# Patient Record
Sex: MALE | Race: WHITE | URBAN - METROPOLITAN AREA
[De-identification: names, ages, dates, MRNs, and addresses within clinical notes are randomized per-mention and may not be internally consistent; named-entity substitution may affect disease eponyms.]

---

## 2018-01-12 ENCOUNTER — HOSPITAL ENCOUNTER (OUTPATIENT)
Dept: MRI IMAGING | Age: 68
Discharge: HOME | End: 2018-01-12
Attending: ORTHOPAEDIC SURGERY | Admitting: ORTHOPAEDIC SURGERY

## 2018-01-12 DIAGNOSIS — M75.102 TEAR OF LEFT ROTATOR CUFF: ICD-10-CM

## 2018-01-12 DIAGNOSIS — M75.102 TEAR OF LEFT ROTATOR CUFF, UNSPECIFIED TEAR EXTENT: ICD-10-CM

## 2018-01-17 ENCOUNTER — TELEPHONE (OUTPATIENT)
Dept: ORTHOPEDIC SURGERY | Age: 68
End: 2018-01-17

## 2018-01-25 ENCOUNTER — HOSPITAL ENCOUNTER (OUTPATIENT)
Dept: SURGERY | Age: 68
Discharge: HOME | End: 2018-01-25
Attending: ORTHOPAEDIC SURGERY | Admitting: ORTHOPAEDIC SURGERY

## 2018-01-25 VITALS
OXYGEN SATURATION: 94 % | RESPIRATION RATE: 18 BRPM | DIASTOLIC BLOOD PRESSURE: 92 MMHG | SYSTOLIC BLOOD PRESSURE: 138 MMHG | TEMPERATURE: 97.1 F | BODY MASS INDEX: 31.62 KG/M2 | HEART RATE: 90 BPM | WEIGHT: 238.56 LBS | HEIGHT: 73 IN

## 2018-01-25 RX ORDER — CEFAZOLIN SODIUM 2 G/100ML
2 INJECTION, SOLUTION INTRAVENOUS
Status: COMPLETED | OUTPATIENT
Start: 2018-01-25 | End: 2018-01-25

## 2018-01-25 RX ORDER — ONDANSETRON 2 MG/ML
4 INJECTION INTRAMUSCULAR; INTRAVENOUS
Status: ACTIVE | OUTPATIENT
Start: 2018-01-25 | End: 2018-01-25

## 2018-01-25 RX ORDER — OXYCODONE HYDROCHLORIDE AND ACETAMINOPHEN 5; 325 MG/1; MG/1
1 TABLET ORAL
Status: ACTIVE | OUTPATIENT
Start: 2018-01-25 | End: 2018-01-25

## 2018-01-25 RX ORDER — SODIUM CHLORIDE 0.9 % (FLUSH) 0.9 %
10 SYRINGE (ML) INJECTION PRN
Status: DISCONTINUED | OUTPATIENT
Start: 2018-01-25 | End: 2018-01-26 | Stop reason: HOSPADM

## 2018-01-25 RX ORDER — HYDROMORPHONE HCL 110MG/55ML
0.5 PATIENT CONTROLLED ANALGESIA SYRINGE INTRAVENOUS EVERY 5 MIN PRN
Status: DISCONTINUED | OUTPATIENT
Start: 2018-01-25 | End: 2018-01-26 | Stop reason: HOSPADM

## 2018-01-25 RX ORDER — SODIUM CHLORIDE 0.9 % (FLUSH) 0.9 %
10 SYRINGE (ML) INJECTION EVERY 12 HOURS SCHEDULED
Status: DISCONTINUED | OUTPATIENT
Start: 2018-01-25 | End: 2018-01-26 | Stop reason: HOSPADM

## 2018-01-25 RX ORDER — MIDAZOLAM HYDROCHLORIDE 1 MG/ML
2 INJECTION INTRAMUSCULAR; INTRAVENOUS ONCE
Status: CANCELLED | OUTPATIENT
Start: 2018-01-25

## 2018-01-25 RX ORDER — SODIUM CHLORIDE, SODIUM LACTATE, POTASSIUM CHLORIDE, CALCIUM CHLORIDE 600; 310; 30; 20 MG/100ML; MG/100ML; MG/100ML; MG/100ML
INJECTION, SOLUTION INTRAVENOUS CONTINUOUS
Status: DISCONTINUED | OUTPATIENT
Start: 2018-01-25 | End: 2018-01-26 | Stop reason: HOSPADM

## 2018-01-25 RX ORDER — MIDAZOLAM HYDROCHLORIDE 1 MG/ML
4 INJECTION INTRAMUSCULAR; INTRAVENOUS ONCE
Status: COMPLETED | OUTPATIENT
Start: 2018-01-25 | End: 2018-01-25

## 2018-01-25 RX ORDER — LIDOCAINE HYDROCHLORIDE 10 MG/ML
0.5 INJECTION, SOLUTION EPIDURAL; INFILTRATION; INTRACAUDAL; PERINEURAL ONCE
Status: DISCONTINUED | OUTPATIENT
Start: 2018-01-25 | End: 2018-01-26 | Stop reason: HOSPADM

## 2018-01-25 RX ORDER — LIDOCAINE HYDROCHLORIDE 10 MG/ML
1 INJECTION, SOLUTION EPIDURAL; INFILTRATION; INTRACAUDAL; PERINEURAL
Status: ACTIVE | OUTPATIENT
Start: 2018-01-25 | End: 2018-01-25

## 2018-01-25 RX ORDER — LABETALOL HYDROCHLORIDE 5 MG/ML
5 INJECTION, SOLUTION INTRAVENOUS EVERY 10 MIN PRN
Status: DISCONTINUED | OUTPATIENT
Start: 2018-01-25 | End: 2018-01-26 | Stop reason: HOSPADM

## 2018-01-25 RX ORDER — HYDRALAZINE HYDROCHLORIDE 20 MG/ML
5 INJECTION INTRAMUSCULAR; INTRAVENOUS EVERY 10 MIN PRN
Status: DISCONTINUED | OUTPATIENT
Start: 2018-01-25 | End: 2018-01-26 | Stop reason: HOSPADM

## 2018-01-25 RX ORDER — MEPERIDINE HYDROCHLORIDE 25 MG/ML
12.5 INJECTION INTRAMUSCULAR; INTRAVENOUS; SUBCUTANEOUS EVERY 5 MIN PRN
Status: DISCONTINUED | OUTPATIENT
Start: 2018-01-25 | End: 2018-01-26 | Stop reason: HOSPADM

## 2018-01-25 RX ADMIN — MIDAZOLAM HYDROCHLORIDE 4 MG: 1 INJECTION INTRAMUSCULAR; INTRAVENOUS at 08:49

## 2018-01-25 RX ADMIN — CEFAZOLIN SODIUM 2 G: 2 INJECTION, SOLUTION INTRAVENOUS at 10:08

## 2018-01-25 RX ADMIN — SODIUM CHLORIDE, SODIUM LACTATE, POTASSIUM CHLORIDE, CALCIUM CHLORIDE: 600; 310; 30; 20 INJECTION, SOLUTION INTRAVENOUS at 08:20

## 2018-01-25 ASSESSMENT — PAIN - FUNCTIONAL ASSESSMENT: PAIN_FUNCTIONAL_ASSESSMENT: 0-10

## 2018-01-25 NOTE — PROCEDURES
Ultrasound-guided Interscalene Nerve Block, Single Shot, Procedure Note    Jules Rider    Procedure: Left Interscalene Nerve Block, Single Shot, Ultrasound-guided  Indications: Postoperative pain control and as requested by surgeon  Consent: Risks/Benefits including infection and nerve injury discussed and         patient wishes to proceed  Timeout completed with RN  Patient Position: Semi-recumbent  Sedation: Versed 4 mg         Fentanyl 0 ml         Propofol 50 mg  Skin Prep: Chlorhexadine  Procedure Note:  Patient identified, consented, site marked, time out performed. Monitors in place. Patient positioned semi-recumbent. Left neck prepped. Interscalene brachial plexus identified on ultrasound. 1% lidocaine used to anesthetize the skin and subcutaneous tissues. 2 in. 21 ga. short bevel Stimuplex needle inserted under ultrasound guidance from lateral aspect in plane with tip in view throughout to the brachial plexus. In divided doses and with negative aspiration throughout, 30 cc of 0.5% bupivacaine plain and preservative free was slowly injected around the plexus. No parasthesias noted and good local spread seen on ultrasound. Patient tolerated the procedure well without any apparent or immediate complications. Ultrasound images placed in patient's chart.     Camille Cobos  9:07 AM

## 2018-01-25 NOTE — H&P
3. Elevated blood pressure:  Likely due to anxiety. Pt reports that he recently \"passed\" his comprehensive [employer] physical      Patient is  medically optimized for surgery. Thank you for the opportunity to participate in the care of your patient.   Guerline Amaro CNP    1/25/2018 8:32 AM

## 2018-01-25 NOTE — PROGRESS NOTES
Time out done @ 0848, then versed 4 mg iv given & 02 @ 2 l/min per n/c given, then dr Blessing Borges completed left intrascalene block, patient tolerated well.

## 2018-01-25 NOTE — ANESTHESIA PRE-OP
Department of Anesthesiology  Preprocedure Note       Name:  Darryl Long   Age:  78 y.o.  :  1950                                          MRN:  [id number]         Date:  2018      Surgeon:    Procedure:    Medications prior to admission:   Prior to Admission medications    Medication Sig Start Date End Date Taking? Authorizing Provider   oxyCODONE-acetaminophen (PERCOCET) 5-325 MG per tablet Take 1-2 tablets by mouth every 4 hours as needed for Pain for up to 7 days 1-2 tablets every 3-4 hours when necessary pain. 18  Valarie Golden MD       Current medications:    Current Outpatient Prescriptions   Medication Sig Dispense Refill    oxyCODONE-acetaminophen (PERCOCET) 5-325 MG per tablet Take 1-2 tablets by mouth every 4 hours as needed for Pain for up to 7 days 1-2 tablets every 3-4 hours when necessary pain.  40 tablet 0     Current Facility-Administered Medications   Medication Dose Route Frequency Provider Last Rate Last Dose    ceFAZolin (ANCEF) 2 g in dextrose 4 % 100 mL IVPB (premix)  2 g Intravenous On Call to [address] Ng MD        lactated ringers infusion   Intravenous Continuous Valarie Golden MD        lidocaine PF 1 % injection 0.5 mL  0.5 mL Subcutaneous Once Valarie Golden MD        HYDROmorphone (DILAUDID) injection 0.5 mg  0.5 mg Intravenous Q5 Min PRN Linda Aparicio MD        oxyCODONE-acetaminophen (PERCOCET) 5-325 MG per tablet 1 tablet  1 tablet Oral Once PRN Linda Aparicio MD        ondansetron [facility]) injection 4 mg  4 mg Intravenous Once PRN Linda Aparicio MD        labetalol (NORMODYNE;TRANDATE) injection 5 mg  5 mg Intravenous Q10 Min PRN Linda Aparicio MD        hydrALAZINE (APRESOLINE) injection 5 mg  5 mg Intravenous Q10 Min PRN Linda Aparicio MD        meperidine (DEMEROL) injection 12.5 mg  12.5 mg Intravenous Q5 Min PRN Linda Aparicio MD Allergies:  No Known Allergies    Problem List:  There is no problem list on file for this patient. Past Medical History:        Diagnosis Date    MRSA (methicillin resistant Staphylococcus aureus) infection 9/12    Prolonged emergence from general anesthesia        Past Surgical History:        Procedure Laterality Date    SHOULDER SURGERY      right        Social History:    Social History   Substance Use Topics    Smoking status: Former Smoker     Types: Cigars    Smokeless tobacco: Never Used    Alcohol use 0.0 oz/week                                Counseling given: Not Answered      Vital Signs (Current): There were no vitals filed for this visit. BP Readings from Last 3 Encounters:   01/24/18 131/86   01/17/18 139/82   01/08/18 128/81       NPO Status:                                                                                 BMI:   Wt Readings from Last 3 Encounters:   01/24/18 235 lb 0.2 oz (106.6 kg)   01/18/18 235 lb (106.6 kg)   01/17/18 229 lb 15 oz (104.3 kg)     There is no height or weight on file to calculate BMI. Anesthesia Evaluation  Patient summary reviewed and Nursing notes reviewed history of anesthetic complications (prolonged emergence):    Airway: Mallampati: II  TM distance: >3 FB   Neck ROM: full  Mouth opening: > = 3 FB Dental: normal exam         Pulmonary:normal exam  breath sounds clear to auscultation      (-) COPD, asthma and sleep apnea                          ROS comment: Former smoker   Cardiovascular:Negative CV ROS        (-) hypertension, past MI, CAD, CABG/stent, dysrhythmias,  angina and  CHF      Rhythm: regular  Rate: normal                    Neuro/Psych:   Negative Neuro/Psych ROS     (-) seizures, TIA and CVA           GI/Hepatic/Renal: Neg GI/Hepatic/Renal ROS       (-) GERD, liver disease and no renal disease       Endo/Other: Negative Endo/Other ROS       (-) hypothyroidism, hyperthyroidism, no Type

## 2018-01-25 NOTE — BRIEF OP NOTE
Brief Postoperative Note    Margret Villa  YOB: 1950  [id number]    Pre-operative Diagnosis: left shoulder rotator cuff tear, impingement, ac arthritis, biceps tear    Post-operative Diagnosis: Same    Procedure: left shoulder arthroscopy, rotator cuff repair, subacromial decompression, distal clavicle excision, major debridement, completion biceps tenotomy    Anesthesia: General and Nerve Block    Surgeons/Assistants: Jerry Garcia MD; Kevin Hinkle MD      Estimated Blood Loss: less than 50     Complications: None    Specimens: Was Not Obtained    Findings: as above    Electronically signed by Venessa Recinos MD on 1/25/2018 at 12:13 PM

## 2018-01-26 ENCOUNTER — OFFICE VISIT (OUTPATIENT)
Dept: ORTHOPEDIC SURGERY | Age: 68
End: 2018-01-26

## 2018-01-26 ENCOUNTER — HOSPITAL ENCOUNTER (OUTPATIENT)
Dept: PHYSICAL THERAPY | Age: 68
Discharge: HOME | End: 2018-01-31
Admitting: ORTHOPAEDIC SURGERY

## 2018-01-26 VITALS
DIASTOLIC BLOOD PRESSURE: 89 MMHG | HEART RATE: 90 BPM | SYSTOLIC BLOOD PRESSURE: 139 MMHG | BODY MASS INDEX: 31.61 KG/M2 | WEIGHT: 238.54 LBS | HEIGHT: 73 IN

## 2018-01-26 DIAGNOSIS — M75.102 TEAR OF LEFT ROTATOR CUFF, UNSPECIFIED TEAR EXTENT: Primary | ICD-10-CM

## 2018-01-26 PROCEDURE — 99024 POSTOP FOLLOW-UP VISIT: CPT | Performed by: ORTHOPAEDIC SURGERY

## 2018-01-26 RX ORDER — DIAZEPAM 5 MG/1
10 TABLET ORAL NIGHTLY PRN
Qty: 5 TABLET | Refills: 0 | Status: SHIPPED | OUTPATIENT
Start: 2018-01-26 | End: 2018-02-05

## 2018-01-26 NOTE — PROGRESS NOTES
Patient returns today one day status post left shoulder rotator cuff repair distal clavicle excision. He is doing well. He still has partial blockade from his interscalene block. ROS: Pertinent items are noted in HPI. No notes on file    Past Medical History:  9/12: MRSA (methicillin resistant Staphylococcus aur*  No date: Prolonged emergence from general anesthesia     Past Surgical History:  01/25/2018: SHOULDER ARTHROSCOPY Left      Comment: DCE SAD RCR  No date: SHOULDER SURGERY      Comment: right     Review of patient's family history indicates:    Cancer                         Father                      Comment: bladder       Social History    Marital status: Single              Spouse name: Zoë Vasquez                 Years of education:                 Number of children: 1             Social History Main Topics    Smoking status: Former Smoker                                                                Packs/day: 0.00      Years: 0.00           Types: Cigars    Smokeless tobacco: Never Used                        Alcohol use: Yes           0.0 oz/week       Comment: social, beer,\"when watch Photos to Photos[employer]\"    Drug use: No                No current outpatient prescriptions on file. No current facility-administered medications for this visit. No Known Allergies    VITAL SIGNS:  /89   Pulse 90   Ht 6' 1\" (1.854 m)   Wt 238 lb 8.6 oz (108.2 kg)   BMI 31.47 kg/m²   On examination his portals are clean and dry. He is comfortable. Moving his elbow wrist and hand normally. He is performing physical therapy without problems. We went over his pictures and showed him what we did. He said his pain medication to awaken and so given him a sedative for just a few days. All questions were answered. Impression doing well one day status post left rotator cuff repair. Plan: Start outpatient physical therapy.

## 2018-01-26 NOTE — PLAN OF CARE
[facility]      Physical Therapy Certification    Dear Referring Practitioner: Trice Carolina. Moises Harding MD,    We had the pleasure of evaluating the following patient for physical therapy services at [address] [place] [address]. A summary of our findings can be found in the initial assessment below. This includes our plan of care. If you have any questions or concerns regarding these findings, please do not hesitate to contact me at the office phone number checked above. Thank you for the referral.       Physician Signature:_______________________________Date:__________________  By signing above (or electronic signature), therapists plan is approved by physician      Patient: Sherrin Spatz   : 1950   MRN: [id number]  Referring Physician: Referring Practitioner: Trice Carolina. Moises Harding MD      Evaluation Date: 2018      Medical Diagnosis Information:  Diagnosis: M75.102 (ICD-10-CM) - Tear of left rotator cuff, unspecified tear extent   Treatment Diagnosis: L shoulder pain, decreased ROM and strength, impaired ADLs/IADLs                                         Insurance information: PT Insurance Information: PT BENEFITS 2018 FACILITY/ EFFECTIVE 16/ ACTIVE/ DED 0/ COPAY 40/ PAYS 100% OOP 4900/ 0 VISIT LIMIT MED NEC/ ORTHONET AUTH SANIAQ/VEGA [id number] PAG/PW DONE  CB/    Precautions/ Contra-indications:   Latex Allergy:  [x]NO      []YES  Preferred Language for Healthcare:   [x]English       []other:    SUBJECTIVE: Patient stated complaint: Pt states that his L shoulder has been bothering him for a long time, but recently he fell. Pt states that he was walking around his  truck and he fell into a pit, tried to grab the mirror to catch himself and fell to the ground. Pt states that since that time he was unable to use his shoulder, lifting his arm was difficult.  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 [] Falls Risk assessed and Patient requires intervention due to being higher risk   TUG score (>12s at risk):     [] Falls education provided, including       G-Codes:  PT G-Codes  Functional Assessment Tool Used: UEFI  Score: 7/80=8.75% (91.25% deficit)  Functional Limitation: Carrying, moving and handling objects  Carrying, Moving and Handling Objects Current Status (): At least 80 percent but less than 100 percent impaired, limited or restricted  Carrying, Moving and Handling Objects Goal Status ():  At least 1 percent but less than 20 percent impaired, limited or restricted    ASSESSMENT:   Functional Impairments   []Noted spinal or UE joint hypomobility   []Noted spinal or UE joint hypermobility   [x]Decreased UE functional ROM   [x]Decreased UE functional strength   []Abnormal reflexes/sensation/myotomal/dermatomal deficits   []Decreased RC/scapular/core strength and neuromuscular control   []other:      Functional Activity Limitations (from functional questionnaire and intake)   [x]Reduced ability to tolerate prolonged functional positions   [x]Reduced ability or difficulty with changes of positions or transfers between positions   []Reduced ability to maintain good posture and demonstrate good body mechanics with sitting, bending, and lifting   [] Reduced ability or tolerance with driving and/or computer work   [x]Reduced ability to sleep   [x]Reduced ability to perform lifting, reaching, carrying tasks   [x]Reduced ability to tolerate impact through UE   [x]Reduced ability to reach behind back   []Reduced ability to  or hold objects   []Reduced ability to throw or toss an object   []other:      Participation Restrictions   [x]Reduced participation in self care activities   [x]Reduced participation in home management activities   [x]Reduced participation in work activities   [x]Reduced participation in social activities. []Reduced participation in sport / recreational activities. Classification:   [x]Signs/symptoms consistent with post-surgical status including decreased ROM, strength and function.   []Signs/symptoms consistent with joint sprain/strain   []Signs/symptoms consistent with shoulder impingement   []Signs/symptoms consistent with shoulder/elbow/wrist tendinopathy   []Signs/symptoms consistent with Rotator cuff tear   []Signs/symptoms consistent with labral tear   []Signs/symptoms consistent with postural dysfunction    []Signs/symptoms consistent with Glenohumeral IR Deficit - <45 degrees   []Signs/symptoms consistent with facet dysfunction of cervical/thoracic spine    []Signs/symptoms consistent with pathology which may benefit from Dry needling     []other:     Prognosis/Rehab Potential:      []Excellent   [x]Good    []Fair   []Poor    Tolerance of evaluation/treatment:    []Excellent   [x]Good    []Fair   []Poor    Physical Therapy Evaluation Complexity Justification  [x] A history of present problem with:  [x] no personal factors and/or comorbidities that impact the plan of care;  []1-2 personal factors and/or comorbidities that impact the plan of care  []3 personal factors and/or comorbidities that impact the plan of care  [x] An examination of body systems using standardized tests and measures addressing any of the following: body structures and functions (impairments), activity limitations, and/or participation restrictions;:  [] a total of 1-2 or more elements   [] a total of 3 or more elements   [x] a total of 4 or more elements   [x] A clinical presentation with:  [] stable and/or uncomplicated characteristics   [x] evolving clinical presentation with changing characteristics  [] unstable and unpredictable characteristics;   [x] Clinical decision making of [] low, [x] moderate, [] high complexity using standardized patient assessment instrument and/or measurable assessment of functional outcome. [] EVAL (LOW) 41155 (typically 20 minutes face-to-face)  [x] EVAL (MOD) 08340 (typically 30 minutes face-to-face)  [] EVAL (HIGH) 25944 (typically 45 minutes face-to-face)  [] RE-EVAL       PLAN:  Frequency/Duration:  1-2 days per week for 12 Weeks:  INTERVENTIONS:  [x] Therapeutic exercise including: strength training, ROM, for Upper extremity and core   [x]  NMR activation and proprioception for UE, scap and Core   [x] Manual therapy as indicated for shoulder, scapula and spine to include: Dry Needling/IASTM, STM, PROM, Gr I-IV mobilizations, manipulation. [x] Modalities as needed that may include: thermal agents, E-stim, Biofeedback, US, iontophoresis as indicated  [x] Patient education on joint protection, postural re-education, activity modification, progression of HEP. HEP instruction:   Initiated 1/26/18. Printed hand out given. Pt demonstrated proper form of each exercise and expressed verbal understanding of frequency and duration. GOALS:  Patient stated goal: be pain free    Therapist goals for Patient:   Short Term Goals: To be achieved in: 4-8 weeks  1. Independent in HEP and progression per patient tolerance, in order to prevent re-injury. 2. Patient will have a decrease in pain to facilitate improvement in movement, function, and ADLs as indicated by Functional Deficits.   3. Patient will tolerate return to driving. 4. Patient will tolerate progression out of sling. Long Term Goals: To be achieved in: 4-6 months  1. Disability index score of 20% or less for the UEFS to assist with reaching prior level of function. 2. Patient will demonstrate increased AROM to [facility] to allow for proper joint functioning as indicated by patients Functional Deficits. 3. Patient will demonstrate an increase in L RC strength to 4+/5 or greater to allow for proper functional mobility as indicated by patients Functional Deficits. 4. Patient will return to ADLs, IADLs and functional activities without increased symptoms or restriction. 5. Patient will tolerate full return to work without limitation     Electronically signed by:  Dom Walsh PT, DPT  Physical Therapist  [email or web address]  [email or web address]

## 2018-01-26 NOTE — FLOWSHEET NOTE
progression  [] Other:     ASSESSMENT:  See eval    Treatment/Activity Tolerance:  [] Patient tolerated treatment well [] Patient limited by fatique  [] Patient limited by pain  [] Patient limited by other medical complications  [] Other:     Prognosis: [] Good [] Fair  [] Poor    Patient Requires Follow-up: [x] Yes  [] No    PLAN: See eval  [] Continue per plan of care [] Alter current plan (see comments)  [x] Plan of care initiated [] Hold pending MD visit [] Discharge    Electronically signed by: Lina Pantoja PT, DPT  Physical Therapist  [id number]  [email or web address]

## 2018-02-01 ENCOUNTER — HOSPITAL ENCOUNTER (OUTPATIENT)
Dept: OTHER | Age: 68
Discharge: HOME | End: 2018-02-28
Attending: ORTHOPAEDIC SURGERY | Admitting: ORTHOPAEDIC SURGERY

## 2018-02-02 ENCOUNTER — HOSPITAL ENCOUNTER (OUTPATIENT)
Dept: PHYSICAL THERAPY | Age: 68
Discharge: HOME | End: 2018-02-28
Admitting: ORTHOPAEDIC SURGERY

## 2018-02-02 NOTE — FLOWSHEET NOTE
[facility], [place]    Physical Therapy Daily Treatment Note  Date:  2018    Patient Name:  Dania Carrillo    :  1950  MRN: [id number]  Restrictions/Precautions:    Medical/Treatment Diagnosis Information:  · Diagnosis: M75.102 (ICD-10-CM) - Tear of left rotator cuff, unspecified tear extent  · S/p L SAD/DCE/supraspinatus repair DOS 18  · Treatment Diagnosis: L shoulder pain, decreased ROM and strength, impaired ADLs/IADLs  Insurance/Certification information:  PT Insurance Information: PT BENEFITS 2018 FACILITY/ EFFECTIVE 16/ ACTIVE/ DED 0/ COPAY 40/ PAYS 100% OOP 4900/ 0 VISIT LIMIT MED ROMARIO/ Jing MCCAIN/VEGA [id number] PAG/PW DONE  CB/  Physician Information:  Referring Practitioner: Gabriel Mahan. Uche Renee MD  Plan of care signed (Y/N): Y    Date of Patient follow up with Physician: 1 mo po    G-Code (if applicable):      Date G-Code Applied:  18  PT G-Codes  Functional Assessment Tool Used: UEFI  Score: =8.75% (91.25% deficit)  Functional Limitation: Carrying, moving and handling objects  Carrying, Moving and Handling Objects Current Status (): At least 80 percent but less than 100 percent impaired, limited or restricted  Carrying, Moving and Handling Objects Goal Status (): At least 1 percent but less than 20 percent impaired, limited or restricted    Progress Note: []  Yes  [x]  No  Next due by: Visit #10     Latex Allergy:  [x]NO      []YES  Preferred Language for Healthcare:   [x]English       []other:    Visit # Insurance Allowable   2 ORTHO NET     Pain level:  0/10 currently    SUBJECTIVE: Pt. Reports that his shoulder is feeling okay today but he does have pain in the evening. Pt. States that when his shoulder is painful he takes the pain medication and lays down until it goes away. Pt. Reports that he has only taken the sling off to take a shower.  Pt. Notes that he didn't have time to perform the exercises at home since therapy eval.     OBJECTIVE:    Observation: pt. Into therapy session with sling incorrectly positioned and moving L arm with talking and walking; pt. Attempted to use L UE when sitting until corrected            ROM - NT PROM AROM  Comment     L R L R     Flexion             Abduction             ER             IR             Other(cervical)             Other                   RESTRICTIONS/PRECAUTIONS: PROM shoulder s/p rotator cuff repair    Exercises/Interventions:   Therapeutic Exercise  Resistance / level Sets/sec Reps Notes   gripping  1 50    Table slides flexion 10\" 10    Elbow AAROM  5\" 15           Shrugs  3 10    Scapular retraction  3 10 Tactile cueing required throughout                                      Neuromuscular Re-ed / Therapeutic Activities                                                        Manual Intervention       Shld /GH Mobs       Post Cap mobs       Thoracic/Rib manipualtion       CT MT/Mobs       PROM MT                  Patient Education:  2/2  - significant time spent educating pt. On importance of wearing sling correct and avoiding use of L UE/shoulder. Readjusted sling and demonstrated to pt. Proper don/doff technique. Reviewed precautions following surgery    Therapeutic Exercise and NMR EXR:  [x] (61724) Provided verbal/tactile cueing for activities related to strengthening, flexibility, endurance, ROM  for improvements in scapular, scapulothoracic and UE control with self care, reaching, carrying, lifting, house/yardwork, driving/computer work.    [] (29849) Provided verbal/tactile cueing for activities related to improving balance, coordination, kinesthetic sense, posture, motor skill, proprioception  to assist with  scapular, scapulothoracic and UE control with self care, reaching, carrying, lifting, house/yardwork, driving/computer work.     Therapeutic Activities:    [] (32894 or ) Provided verbal/tactile cueing for activities related to improving balance, coordination, kinesthetic sense, posture, motor skill, proprioception and motor activation to allow for proper function of scapular, scapulothoracic and UE control with self care, carrying, lifting, driving/computer work. Home Exercise Program:    [x] (50973) Reviewed/Progressed HEP activities related to strengthening, flexibility, endurance, ROM of scapular, scapulothoracic and UE control with self care, reaching, carrying, lifting, house/yardwork, driving/computer work  [] (47144) Reviewed/Progressed HEP activities related to improving balance, coordination, kinesthetic sense, posture, motor skill, proprioception of scapular, scapulothoracic and UE control with self care, reaching, carrying, lifting, house/yardwork, driving/computer work      Manual Treatments:    [] (07459) Provided manual therapy to mobilize soft tissue/joints of cervical/CT, scapular GHJ and UE for the purpose of modulating pain, promoting relaxation,  increasing ROM, reducing/eliminating soft tissue swelling/inflammation/restriction, improving soft tissue extensibility and allowing for proper ROM for normal function with self care, reaching, carrying, lifting, house/yardwork, driving/computer work    Modalities:     Ice - 15'    Charges:  Timed Code Treatment Minutes: 26   Total Treatment Minutes: 45     [] EVAL  [x] KL(61667) x  1   [] IONTO  [] NMR (83742) x      [] VASO  [] Manual (33406) x       [] Other:  [x] TA x  1    [] Mech Traction (44919)  [] ES(attended) (47745)      [] ES (un) (10708):     GOALS:  Patient stated goal: be pain free     Therapist goals for Patient:   Short Term Goals: To be achieved in: 4-8 weeks 2/23/18-3/23/18  1. Independent in HEP and progression per patient tolerance, in order to prevent re-injury. 2. Patient will have a decrease in pain to facilitate improvement in movement, function, and ADLs as indicated by Functional Deficits.   3. Patient will tolerate return to

## 2018-02-09 ENCOUNTER — HOSPITAL ENCOUNTER (OUTPATIENT)
Dept: PHYSICAL THERAPY | Age: 68
Discharge: HOME | End: 2018-02-10
Admitting: ORTHOPAEDIC SURGERY

## 2018-02-09 NOTE — FLOWSHEET NOTE
lifting, driving/computer work. Home Exercise Program:    [x] (78155) Reviewed/Progressed HEP activities related to strengthening, flexibility, endurance, ROM of scapular, scapulothoracic and UE control with self care, reaching, carrying, lifting, house/yardwork, driving/computer work  [] (57865) Reviewed/Progressed HEP activities related to improving balance, coordination, kinesthetic sense, posture, motor skill, proprioception of scapular, scapulothoracic and UE control with self care, reaching, carrying, lifting, house/yardwork, driving/computer work      Manual Treatments:    [] (13126) Provided manual therapy to mobilize soft tissue/joints of cervical/CT, scapular GHJ and UE for the purpose of modulating pain, promoting relaxation,  increasing ROM, reducing/eliminating soft tissue swelling/inflammation/restriction, improving soft tissue extensibility and allowing for proper ROM for normal function with self care, reaching, carrying, lifting, house/yardwork, driving/computer work    Modalities:     Ice - 15'    Charges:  Timed Code Treatment Minutes: 25   Total Treatment Minutes: 45     [] EVAL  [x] BW(63339) x  1   [] IONTO  [] NMR (23025) x      [] VASO  [] Manual (52742) x       [] Other:  [x] TA x  1    [] Mech Traction (20594)  [] ES(attended) (95370)      [] ES (un) (30968):     GOALS:  Patient stated goal: be pain free     Therapist goals for Patient:   Short Term Goals: To be achieved in: 4-8 weeks 2/23/18-3/23/18  1. Independent in HEP and progression per patient tolerance, in order to prevent re-injury. 2. Patient will have a decrease in pain to facilitate improvement in movement, function, and ADLs as indicated by Functional Deficits. 3. Patient will tolerate return to driving. 4. Patient will tolerate progression out of sling.     Long Term Goals: To be achieved in: 4-6 months 5/25/18-7/25/18  1.  Disability index score of 20% or less for the UEFS to assist with reaching prior level of

## 2018-02-16 ENCOUNTER — HOSPITAL ENCOUNTER (OUTPATIENT)
Dept: PHYSICAL THERAPY | Age: 68
Discharge: HOME | End: 2018-02-17
Admitting: ORTHOPAEDIC SURGERY

## 2018-02-16 NOTE — FLOWSHEET NOTE
getting dressed this morning. Pt. States that he completes some of the exercises occasionally. OBJECTIVE:     2/9: incisions - sutures removed without issue, incisions closed and healing, - signs & sxs of infection            ROM  PROM - 2/16 AROM  Comment     L R L R     Flexion   ~110 table slides         Abduction             ER             IR             Other(cervical)             Other                   RESTRICTIONS/PRECAUTIONS: PROM shoulder s/p rotator cuff repair    Exercises/Interventions:   Therapeutic Exercise  Resistance / level Sets/sec Reps Notes   gripping  1 50    Table slides flexion 10\" 10    Elbow AAROM  5\" 15           Shrugs  3 10    Scapular retraction  3 10 Tactile cueing required throughout                                      Neuromuscular Re-ed / Therapeutic Activities                                                        Manual Intervention       Shld /GH Mobs       Post Cap mobs       Thoracic/Rib manipualtion       CT MT/Mobs       PROM MT Gentle flex, abd 10'                Patient Education:  2/16 - reviewed precautions following surgery and importance of following restrictions     Therapeutic Exercise and NMR EXR:  [x] (62071) Provided verbal/tactile cueing for activities related to strengthening, flexibility, endurance, ROM  for improvements in scapular, scapulothoracic and UE control with self care, reaching, carrying, lifting, house/yardwork, driving/computer work.    [] (65830) Provided verbal/tactile cueing for activities related to improving balance, coordination, kinesthetic sense, posture, motor skill, proprioception  to assist with  scapular, scapulothoracic and UE control with self care, reaching, carrying, lifting, house/yardwork, driving/computer work.     Therapeutic Activities:    [] (24202 or 00996) Provided verbal/tactile cueing for activities related to improving balance, coordination, kinesthetic sense, posture, motor skill, proprioception and motor activation to

## 2018-02-23 ENCOUNTER — HOSPITAL ENCOUNTER (OUTPATIENT)
Dept: PHYSICAL THERAPY | Age: 68
Discharge: HOME | End: 2018-02-24
Admitting: ORTHOPAEDIC SURGERY

## 2018-02-23 NOTE — FLOWSHEET NOTE
[facility], [place]    Physical Therapy Daily Treatment Note  Date:  2018    Patient Name:  Betsy Helm    :  1950  MRN: [id number]  Restrictions/Precautions:    Medical/Treatment Diagnosis Information:  · Diagnosis: M75.102 (ICD-10-CM) - Tear of left rotator cuff, unspecified tear extent  · S/p L SAD/DCE/supraspinatus repair DOS 18  · Treatment Diagnosis: L shoulder pain, decreased ROM and strength, impaired ADLs/IADLs  Insurance/Certification information:  PT Insurance Information: PT BENEFITS 2018 FACILITY/ EFFECTIVE 16/ ACTIVE/ DED 0/ COPAY 40/ PAYS 100% OOP 4900/ 0 VISIT LIMIT MED NEC/ Ivett LUIQ/VEGA [id number] PAG/PW DONE  CB/  Physician Information:  Referring Practitioner: Lucy Ellis. Dian Ferrara MD  Plan of care signed (Y/N): Y    Date of Patient follow up with Physician: 1 mo po    G-Code (if applicable):      Date G-Code Applied:  18  PT G-Codes  Functional Assessment Tool Used: UEFI  Score: 80=8.75% (91.25% deficit)  Functional Limitation: Carrying, moving and handling objects  Carrying, Moving and Handling Objects Current Status (): At least 80 percent but less than 100 percent impaired, limited or restricted  Carrying, Moving and Handling Objects Goal Status (): At least 1 percent but less than 20 percent impaired, limited or restricted    Progress Note: []  Yes  [x]  No  Next due by: Visit #10     Latex Allergy:  [x]NO      []YES  Preferred Language for Healthcare:   [x]English       []other:    Visit # Insurance Allowable   5 12 orthonet auth  Through 3/19/18     Pain level:  0/10 currently    SUBJECTIVE: Pt. Notes that his shoulder is a little more sore today, possibly due to the weather. Pt. States that when he was putting his shirt on he felt 10/10 sharp pain but this went away quickly. Pt. Notes that requires assist for dressing because of his arm.  Pt. Reports completing HEP 3x since last therapy session. OBJECTIVE:     2/9: incisions - sutures removed without issue, incisions closed and healing, - signs & sxs of infection            ROM  PROM - 2/16 AROM  Comment     L R L R     Flexion   ~110 table slides         Abduction             ER             IR             Other(cervical)             Other                   RESTRICTIONS/PRECAUTIONS: PROM shoulder s/p rotator cuff repair    Exercises/Interventions:   Therapeutic Exercise  Resistance / level Sets/sec Reps Notes   gripping  1 50    Table slides flexion 10\" 10    ER ranger neutral 10\" 10 Start 2/23          Shrugs  3 10    Scapular retraction  3 10 Tactile cueing required throughout          biceps  3 10 Start 2/23                        Neuromuscular Re-ed / Therapeutic Activities                                                        Manual Intervention       Shld /GH Mobs       Post Cap mobs       Thoracic/Rib manipualtion       CT MT/Mobs       PROM MT Gentle flex, abd 12'                Patient Education:  2/23 - reviewed precautions following surgery and importance of following restrictions; discussed importance of completing HEP; reviewed correct technique for don/doff sling    Therapeutic Exercise and NMR EXR:  [x] (62482) Provided verbal/tactile cueing for activities related to strengthening, flexibility, endurance, ROM  for improvements in scapular, scapulothoracic and UE control with self care, reaching, carrying, lifting, house/yardwork, driving/computer work.    [] (85214) Provided verbal/tactile cueing for activities related to improving balance, coordination, kinesthetic sense, posture, motor skill, proprioception  to assist with  scapular, scapulothoracic and UE control with self care, reaching, carrying, lifting, house/yardwork, driving/computer work.     Therapeutic Activities:    [] (04110 or 45476) Provided verbal/tactile cueing for activities related to improving balance, coordination, kinesthetic sense, Term Goals: To be achieved in: 4-6 months 5/25/18-7/25/18  1. Disability index score of 20% or less for the UEFS to assist with reaching prior level of function. 2. Patient will demonstrate increased AROM to [facility] to allow for proper joint functioning as indicated by patients Functional Deficits. 3. Patient will demonstrate an increase in L RC strength to 4+/5 or greater to allow for proper functional mobility as indicated by patients Functional Deficits. 4. Patient will return to ADLs, IADLs and functional activities without increased symptoms or restriction. 5. Patient will tolerate full return to work without limitation         Progression Towards Functional goals:  [] Patient is progressing as expected towards functional goals listed. [] Progression is slowed due to complexities listed. [] Progression has been slowed due to co-morbidities. [x] Plan just implemented, too soon to assess goals progression  [] Other:     ASSESSMENT:      Treatment/Activity Tolerance:  [x] Patient tolerated treatment well [] Patient limited by fatique  [] Patient limited by pain  [] Patient limited by other medical complications  [x] Other: Pt. Tolerated therapy today with minimal complaints. Pt. Continues to require constant cueing throughout therapy session to maintain precautions and assist for don/doff sling. Initiated ER ranger at neutral to be performed in clinic only with therapist assist. Pt. With muscle guarding throughout with manual PROM. Pt. Slowly improving in shoulder passive mobility as expected but has difficulty relaxing arm throughout. Pt. Requires continued progression of post-op protocol in order to restore functional use of arm.      Prognosis: [x] Good [] Fair  [] Poor    Patient Requires Follow-up: [x] Yes  [] No    PLAN: 1-2x/wk  [x] Continue per plan of care [] Alter current plan (see comments)  [] Plan of care initiated [] Hold pending MD visit [] Discharge    Electronically signed by: Florecita Donis

## 2018-03-01 ENCOUNTER — OFFICE VISIT (OUTPATIENT)
Dept: ORTHOPEDIC SURGERY | Age: 68
End: 2018-03-01

## 2018-03-01 ENCOUNTER — HOSPITAL ENCOUNTER (OUTPATIENT)
Dept: PHYSICAL THERAPY | Age: 68
Discharge: HOME | End: 2018-03-31
Attending: ORTHOPAEDIC SURGERY | Admitting: ORTHOPAEDIC SURGERY

## 2018-03-01 VITALS
BODY MASS INDEX: 31.14 KG/M2 | SYSTOLIC BLOOD PRESSURE: 119 MMHG | DIASTOLIC BLOOD PRESSURE: 77 MMHG | HEART RATE: 109 BPM | HEIGHT: 73 IN | WEIGHT: 235 LBS

## 2018-03-01 DIAGNOSIS — M17.12 ARTHRITIS OF LEFT KNEE: ICD-10-CM

## 2018-03-01 DIAGNOSIS — M25.562 CHRONIC PAIN OF LEFT KNEE: Primary | ICD-10-CM

## 2018-03-01 DIAGNOSIS — S80.02XA CONTUSION OF LEFT KNEE, INITIAL ENCOUNTER: ICD-10-CM

## 2018-03-01 DIAGNOSIS — G89.29 CHRONIC PAIN OF LEFT KNEE: Primary | ICD-10-CM

## 2018-03-01 PROCEDURE — 99213 OFFICE O/P EST LOW 20 MIN: CPT | Performed by: PHYSICIAN ASSISTANT

## 2018-03-01 RX ORDER — NAPROXEN 500 MG/1
500 TABLET ORAL 2 TIMES DAILY WITH MEALS
Qty: 60 TABLET | Refills: 0 | Status: SHIPPED | OUTPATIENT
Start: 2018-03-01 | End: 2018-04-03 | Stop reason: SDUPTHER

## 2018-03-01 NOTE — PROGRESS NOTES
Subjective:      Patient ID: Esther Holden is a 80 y.o. male. Chief Complaint   Patient presents with    Knee Pain     Patient states he has had left knee pain for a few years and progressively getting worse        HPI:   He is here in the [address] [place] [address] for an initial evaluation of a new problem. Left knee pain. He has a history of popping and catching in the anterior aspect of his left knee for the last 10-15 years. Yesterday, he struck the anterior aspect of the knee against a door and now having significant anterior knee pain. Pain Scale 10/10 VAS with activity. Location of pain anterior knee. Pain is worse with weightbearing activity. Pain improves with ice and elevation. Previous treatments have included ice last evening. Elevation mild relief. He is status post a left shoulder arthroscopy and decompression by Dr. Chahal Husbands. He is about 5 weeks postop. Review Of Systems:   As outlined in the HPI. Negative for fever or chills. Positive for joint pain, swelling and stiffness. Negative for numbness or tingling. Past Medical History:   Diagnosis Date    MRSA (methicillin resistant Staphylococcus aureus) infection 9/12    Prolonged emergence from general anesthesia        Family History   Problem Relation Age of Onset    Cancer Father      bladder        Past Surgical History:   Procedure Laterality Date    SHOULDER ARTHROSCOPY Left 01/25/2018    DCE SAD RCR    SHOULDER SURGERY      right        Social History     Occupational History    Not on file.      Social History Main Topics    Smoking status: Former Smoker     Types: Cigars    Smokeless tobacco: Never Used    Alcohol use 0.0 oz/week      Comment: social, beer,\"when watch [employer]\"    Drug use: No    Sexual activity: Not on file       Current Outpatient Prescriptions   Medication Sig Dispense Refill    naproxen (NAPROSYN) 500 MG tablet Take 1 tablet by mouth 2 times daily lower extremities reveals: The skin to be intact without lacerations or abrasions. No significant erythema. No rashes or skin lesions. X Rays: performed in the office today:   AP Standing, Lateral and Sunrise views of left knee: There is moderate osteoarthritic findings of the left knee noted with about 50% narrowing of the medial joint space. Mild to moderate patellofemoral degenerative changes noted as well. No acute fractures or dislocations noted. Additional Tests reviewed: None  Additional Outside Records reviewed: None    Diagnosis:       ICD-10-CM ICD-9-CM    1. Chronic pain of left knee M25.562 719.46 XR KNEE LEFT (3 VIEWS)    G89.29 338.29    2. Arthritis of left knee M17.12 716.96    3. Contusion of left knee, initial encounter S80. 02XA 924.11         Assessment and Plan:     The natural history of the patient's diagnosis as well as the treatment options were discussed in full and questions were answered. Risks and benefits of the treatment options also reviewed in detail. He has left knee arthritis which was pre-existing. He has acute contusion to the left knee. Rest, Ice, Compression and Elevation  NSAID'S discussed to be taken in appropriate  therapeutic doses. A prescription for Naprosyn 500 mg 1 p.o. b.i.d. Activity restriction/ Modification discussed. The patient was advised that NSAID-type medications have two very important potential side effects: gastrointestinal irritation including hemorrhage and renal injuries. He was asked to take the medication with food and to stop if he experiences any GI upset. I asked him to call for vomiting, abdominal pain or black/bloody stools. He should have renal function testing per his medical provider periodically. The patient expresses understanding of these issues and questions were answered.       Follow Up: 1 or 2 weeks with   Call or return to clinic prn if these symptoms worsen or fail to improve as

## 2018-03-02 ENCOUNTER — HOSPITAL ENCOUNTER (OUTPATIENT)
Dept: PHYSICAL THERAPY | Age: 68
Discharge: HOME | End: 2018-03-03
Admitting: ORTHOPAEDIC SURGERY

## 2018-03-02 NOTE — FLOWSHEET NOTE
[facility]  Orthopaedics and Sports Rehabilitation, [place]    Physical Therapy Daily Treatment Note  Date:  3/2/2018    Patient Name:  Clay Brown    :  1950  MRN: [id number]  Restrictions/Precautions:    Medical/Treatment Diagnosis Information:  · Diagnosis: M75.102 (ICD-10-CM) - Tear of left rotator cuff, unspecified tear extent  · S/p L SAD/DCE/supraspinatus repair DOS 18  · Treatment Diagnosis: L shoulder pain, decreased ROM and strength, impaired ADLs/IADLs  Insurance/Certification information:  PT Insurance Information: PT BENEFITS 2018 FACILITY/ EFFECTIVE 16/ ACTIVE/ DED 0/ COPAY 40/ PAYS 100% OOP 4900/ 0 VISIT LIMIT ISAK DOSS/ Madelaine MCCAIN/VEGA [id number] PAG/PW DONE  CB/  Physician Information:  Referring Practitioner: Omega Joseph MD  Plan of care signed (Y/N): Y    Date of Patient follow up with Physician: 1 mo po    G-Code (if applicable):      Date G-Code Applied:  18  PT G-Codes  Functional Assessment Tool Used: UEFI  Score: 80=8.75% (91.25% deficit)  Functional Limitation: Carrying, moving and handling objects  Carrying, Moving and Handling Objects Current Status (): At least 80 percent but less than 100 percent impaired, limited or restricted  Carrying, Moving and Handling Objects Goal Status (): At least 1 percent but less than 20 percent impaired, limited or restricted    Progress Note: []  Yes  [x]  No  Next due by: Visit #10     Latex Allergy:  [x]NO      []YES  Preferred Language for Healthcare:   [x]English       []other:    Visit # Insurance Allowable   6 12 orthonet auth  Through 3/19/18     Pain level:  0/10 currently    SUBJECTIVE: Pt states \"it's been hurting\" the last few days but denies pain currently stating \"I'm not moving it, it only hurts when I move it like this\" and starts moving arm in circular motion while in sling.  States \"I've worn my sling all the time\"      OBJECTIVE:     : incisions - sutures removed without issue, incisions closed and healing, - signs & sxs of infection            ROM  PROM - 2/16 AROM  Comment     L R L R     Flexion   ~110 table slides         Abduction             ER             IR             Other(cervical)             Other                   RESTRICTIONS/PRECAUTIONS: PROM shoulder s/p rotator cuff repair    Exercises/Interventions:   Therapeutic Exercise  Resistance / level Sets/sec Reps Notes   gripping  1 50    Table slides flexion 10\" 10    ER ranger neutral 10\" 10 Start 2/23          Shrugs  3 10    Scapular retraction  3 10 Tactile cueing required throughout          biceps  3 10 Start 2/23                        Neuromuscular Re-ed / Therapeutic Activities                                                        Manual Intervention       Shld /GH Mobs       Post Cap mobs       Thoracic/Rib manipualtion       CT MT/Mobs       PROM MT Gentle flex, abd 12'                Patient Education:  2/23 - reviewed precautions following surgery and importance of following restrictions; discussed importance of completing HEP; reviewed correct technique for don/doff sling    Therapeutic Exercise and NMR EXR:  [x] (30272) Provided verbal/tactile cueing for activities related to strengthening, flexibility, endurance, ROM  for improvements in scapular, scapulothoracic and UE control with self care, reaching, carrying, lifting, house/yardwork, driving/computer work.    [] (30916) Provided verbal/tactile cueing for activities related to improving balance, coordination, kinesthetic sense, posture, motor skill, proprioception  to assist with  scapular, scapulothoracic and UE control with self care, reaching, carrying, lifting, house/yardwork, driving/computer work.     Therapeutic Activities:    [] (75589 or 95043) Provided verbal/tactile cueing for activities related to improving balance, coordination, kinesthetic sense, posture, motor skill, proprioception and motor activation to allow

## 2018-03-16 ENCOUNTER — HOSPITAL ENCOUNTER (OUTPATIENT)
Dept: PHYSICAL THERAPY | Age: 68
Discharge: HOME | End: 2018-03-17
Admitting: ORTHOPAEDIC SURGERY

## 2018-03-16 NOTE — PLAN OF CARE
Therapeutic Exercise  Resistance / level Sets/sec Reps Notes   gripping  1 50    Table slides flexion 10\" 10    ER ranger neutral 10\" 10 Start 2/23 - cueing for proper technique          Shrugs - light weight 3# 3 10 ^3/16   Scapular retraction  3 10 Tactile cueing required throughout   tricep- theraband red 3 10 Added 3/16   Biceps - light weight 3# 3 10 Start 2/23  ^3/16                        Neuromuscular Re-ed / Therapeutic Activities       Isometrics  NPV? 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 Despite continued conversation, pt continues to state that he doesn't see an issue with performing single hand/knee driving. \"It's only for just a second or so\". Therapeutic Exercise and NMR EXR:  [x] (69607) Provided verbal/tactile cueing for activities related to strengthening, flexibility, endurance, ROM  for improvements in scapular, scapulothoracic and UE control with self care, reaching, carrying, lifting, house/yardwork, driving/computer work.    [] (21937) Provided verbal/tactile cueing for activities related to improving balance, coordination, kinesthetic sense, posture, motor skill, proprioception  to assist with  scapular, scapulothoracic and UE control with self care, reaching, carrying, lifting, house/yardwork, driving/computer work.     Therapeutic Activities:    [] (00496 or 78440) Provided verbal/tactile cueing for activities related to improving balance, coordination, kinesthetic sense, posture, motor skill, proprioception and motor activation to allow for proper function of scapular, scapulothoracic and UE control with self care, carrying, lifting, driving/computer work. Home Exercise Program:    [x] (50796) Reviewed/Progressed HEP activities related to strengthening, flexibility, endurance, ROM of scapular, scapulothoracic and UE control with self care, reaching, carrying, lifting, house/yardwork, driving/computer work  [] (06261) Reviewed/Progressed HEP activities related to improving balance, coordination, kinesthetic sense, posture, motor skill, proprioception of scapular, scapulothoracic and UE control with self care, reaching, carrying, lifting, house/yardwork, driving/computer work      Manual Treatments:    [] (05759) Provided manual therapy to mobilize soft tissue/joints of cervical/CT, scapular GHJ and UE for the purpose of modulating pain, promoting relaxation,  increasing ROM, reducing/eliminating soft tissue swelling/inflammation/restriction, improving soft tissue extensibility and allowing for proper ROM for normal function with self care, reaching, carrying, lifting, house/yardwork, driving/computer work    Modalities:     Ice - 15'    Charges:  Timed Code Treatment Minutes: 37   Total Treatment Minutes: 60     [] EVAL  [x] FN(20671) x  1   [] IONTO  [] NMR (58533) x      [] VASO  [x] Manual (39688) x  1    [] Other:  [] TA x       [] Mech Traction (67000)  [] ES(attended) (52591)      [] ES (un) (48073):     GOALS:  Patient stated goal: be pain free     Therapist goals for Patient:   Short Term Goals: To be achieved in: 4-8 weeks 2/23/18-3/23/18  1. Independent in HEP and progression per patient tolerance, in order to prevent re-injury. 2. Patient will have a decrease in pain to facilitate improvement in movement, function, and ADLs as indicated by Functional Deficits. 3. Patient will tolerate return to driving. 4. Patient will tolerate progression out of sling.     Long Term Goals: To be achieved in: 4-6 months 5/25/18-7/25/18  1. Disability index score of 20% or less for the UEFS to assist with reaching prior level of function.    2.

## 2018-03-23 ENCOUNTER — HOSPITAL ENCOUNTER (OUTPATIENT)
Dept: PHYSICAL THERAPY | Age: 68
Discharge: HOME | End: 2018-03-24
Admitting: ORTHOPAEDIC SURGERY

## 2018-03-23 NOTE — FLOWSHEET NOTE
sxs of infection            ROM  PROM - 3/16 AROM  Comment     L R L R     Flexion 150 degrees          Abduction  115 degrees           ER- neutral  20 degrees           IR             Other(cervical)             Other                   RESTRICTIONS/PRECAUTIONS: PROM shoulder s/p rotator cuff repair    Exercises/Interventions:   Therapeutic Exercise  Resistance / level Sets/sec Reps Notes   gripping  1 50    Table slides flexion 10\" 10    ER ranger neutral 10\" 10 Start 2/23 - cueing for proper technique   pulleys npv             Shrugs - light weight 3# 3 10 ^3/16   Scapular retraction ^NPV 3 10 Tactile cueing required throughout   tricep- theraband red 3 10 Added 3/16   Biceps - light weight 3# 3 10 Start 2/23  ^3/16          ER isometric  10\" 10 Start 3/23   IR isometric  10\" 10 Start 3/23                 Neuromuscular Re-ed / Therapeutic Activities                                                        Manual Intervention       Shld /GH Mobs       Post Cap mobs       Thoracic/Rib manipualtion       CT MT/Mobs       PROM MT Gentle flex, abd 10'                Patient Education: 3/23  -reviewed precautions following therapy at this time  -discussed beginning to wean from sling within the home but no lifting with L arm at this time    Therapeutic Exercise and NMR EXR:  [x] (70231) Provided verbal/tactile cueing for activities related to strengthening, flexibility, endurance, ROM  for improvements in scapular, scapulothoracic and UE control with self care, reaching, carrying, lifting, house/yardwork, driving/computer work.    [] (21540) Provided verbal/tactile cueing for activities related to improving balance, coordination, kinesthetic sense, posture, motor skill, proprioception  to assist with  scapular, scapulothoracic and UE control with self care, reaching, carrying, lifting, house/yardwork, driving/computer work.     Therapeutic Activities:    [] (13730 or ) Provided verbal/tactile cueing for activities

## 2018-03-28 ENCOUNTER — HOSPITAL ENCOUNTER (OUTPATIENT)
Dept: PHYSICAL THERAPY | Age: 68
Discharge: HOME | End: 2018-03-29
Admitting: ORTHOPAEDIC SURGERY

## 2018-03-28 NOTE — FLOWSHEET NOTE
closed and healing, - signs & sxs of infection            ROM  PROM - 3/16 AROM  Comment     L R L R     Flexion 150 degrees          Abduction  115 degrees           ER- neutral  20 degrees           IR             Other(cervical)             Other                   RESTRICTIONS/PRECAUTIONS: PROM shoulder s/p rotator cuff repair    Exercises/Interventions:   Therapeutic Exercise  Resistance / level Sets/sec Reps Notes   gripping  1 50    Table slides flexion 10\" 10    ER ranger neutral 10\" 10 Start 2/23 - cueing for proper technique   pulleys  10\" 10 Added 3/28          Shrugs - light weight 3# 3 10 ^3/16   Scapular retraction green 3 10 Tactile cueing required throughout  ^3/28   tricep- theraband red 3 10 Added 3/16   Biceps - light weight 3# 3 10 Start 2/23  ^3/16          ER isometric Red t-band 10\" 10 Start 3/23   ^3/28   IR isometric Red t-band 10\" 10 Start 3/23  ^3/28                 Neuromuscular Re-ed / Therapeutic Activities                                                        Manual Intervention       Shld /GH Mobs       Post Cap mobs       Thoracic/Rib manipualtion       CT MT/Mobs       PROM MT Gentle flex, abd 10'                Patient Education: 3/23  -reviewed precautions following therapy at this time  -discussed beginning to wean from sling within the home but no lifting with L arm at this time    Therapeutic Exercise and NMR EXR:  [x] (81354) Provided verbal/tactile cueing for activities related to strengthening, flexibility, endurance, ROM  for improvements in scapular, scapulothoracic and UE control with self care, reaching, carrying, lifting, house/yardwork, driving/computer work.    [] (93004) Provided verbal/tactile cueing for activities related to improving balance, coordination, kinesthetic sense, posture, motor skill, proprioception  to assist with  scapular, scapulothoracic and UE control with self care, reaching, carrying, lifting, house/yardwork, driving/computer work.    Therapeutic Activities:    [] (42133 or 50562) Provided verbal/tactile cueing for activities related to improving balance, coordination, kinesthetic sense, posture, motor skill, proprioception and motor activation to allow for proper function of scapular, scapulothoracic and UE control with self care, carrying, lifting, driving/computer work. Home Exercise Program:    [x] (25249) Reviewed/Progressed HEP activities related to strengthening, flexibility, endurance, ROM of scapular, scapulothoracic and UE control with self care, reaching, carrying, lifting, house/yardwork, driving/computer work  [] (99470) Reviewed/Progressed HEP activities related to improving balance, coordination, kinesthetic sense, posture, motor skill, proprioception of scapular, scapulothoracic and UE control with self care, reaching, carrying, lifting, house/yardwork, driving/computer work      Manual Treatments:    [] (88990) Provided manual therapy to mobilize soft tissue/joints of cervical/CT, scapular GHJ and UE for the purpose of modulating pain, promoting relaxation,  increasing ROM, reducing/eliminating soft tissue swelling/inflammation/restriction, improving soft tissue extensibility and allowing for proper ROM for normal function with self care, reaching, carrying, lifting, house/yardwork, driving/computer work    Modalities:     ' denied need for ice this date. Charges:  Timed Code Treatment Minutes: 48   Total Treatment Minutes: 55     [] EVAL  [x] EC(25673) x  2   [] IONTO  [] NMR (70756) x      [] VASO  [x] Manual (38538) x  1    [] Other:  [] TA x       [] Mech Traction (35808)  [] ES(attended) (71367)      [] ES (un) (28282):     GOALS:  Patient stated goal: be pain free     Therapist goals for Patient:   Short Term Goals: To be achieved in: 4-8 weeks 2/23/18-3/23/18  1. Independent in HEP and progression per patient tolerance, in order to prevent re-injury. MET  2.  Patient will have a decrease in pain to

## 2018-04-01 ENCOUNTER — HOSPITAL ENCOUNTER (OUTPATIENT)
Dept: PHYSICAL THERAPY | Age: 68
Discharge: HOME | End: 2018-04-30
Attending: ORTHOPAEDIC SURGERY | Admitting: ORTHOPAEDIC SURGERY

## 2018-04-05 RX ORDER — NAPROXEN 500 MG/1
TABLET ORAL
Qty: 60 TABLET | Refills: 0 | Status: SHIPPED | OUTPATIENT
Start: 2018-04-05

## 2018-04-06 ENCOUNTER — HOSPITAL ENCOUNTER (OUTPATIENT)
Dept: PHYSICAL THERAPY | Age: 68
Discharge: HOME | End: 2018-04-07
Admitting: ORTHOPAEDIC SURGERY

## 2018-04-13 ENCOUNTER — HOSPITAL ENCOUNTER (OUTPATIENT)
Dept: PHYSICAL THERAPY | Age: 68
Discharge: HOME | End: 2018-04-14
Admitting: ORTHOPAEDIC SURGERY

## 2018-04-20 ENCOUNTER — HOSPITAL ENCOUNTER (OUTPATIENT)
Dept: PHYSICAL THERAPY | Age: 68
Discharge: HOME | End: 2018-04-21
Admitting: ORTHOPAEDIC SURGERY

## 2018-05-01 ENCOUNTER — HOSPITAL ENCOUNTER (OUTPATIENT)
Dept: PHYSICAL THERAPY | Age: 68
Discharge: HOME | End: 2018-05-14
Attending: ORTHOPAEDIC SURGERY | Admitting: ORTHOPAEDIC SURGERY

## 2018-05-11 ENCOUNTER — HOSPITAL ENCOUNTER (OUTPATIENT)
Dept: PHYSICAL THERAPY | Age: 68
Discharge: HOME | End: 2018-05-12
Admitting: ORTHOPAEDIC SURGERY